# Patient Record
(demographics unavailable — no encounter records)

---

## 2025-06-11 NOTE — DISCUSSION/SUMMARY
[FreeTextEntry1] : 28 yo for AUB 2/2 likely microadenoma - advised f/u with endocrinology, referral provided - Advised provera q 3 months if no periods

## 2025-06-11 NOTE — HISTORY OF PRESENT ILLNESS
[FreeTextEntry1] : 28 yo P1 presents for follow up results for secondary amenorrhea. Patient reports VB after taking provera. Labs were significant for elevated prolactin level. She had a pituitary MRI revealing a possible 1-2mm microadenoma. Denies headaches, vision changes.